# Patient Record
Sex: FEMALE | Race: AMERICAN INDIAN OR ALASKA NATIVE
[De-identification: names, ages, dates, MRNs, and addresses within clinical notes are randomized per-mention and may not be internally consistent; named-entity substitution may affect disease eponyms.]

---

## 2018-06-20 ENCOUNTER — HOSPITAL ENCOUNTER (EMERGENCY)
Dept: HOSPITAL 31 - C.ER | Age: 58
Discharge: HOME | End: 2018-06-20
Payer: COMMERCIAL

## 2018-06-20 VITALS — TEMPERATURE: 99 F

## 2018-06-20 VITALS — RESPIRATION RATE: 18 BRPM | HEART RATE: 78 BPM | SYSTOLIC BLOOD PRESSURE: 154 MMHG | DIASTOLIC BLOOD PRESSURE: 78 MMHG

## 2018-06-20 VITALS — OXYGEN SATURATION: 99 %

## 2018-06-20 DIAGNOSIS — S91.311A: Primary | ICD-10-CM
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
## 2018-06-20 NOTE — C.PDOC
History Of Present Illness





58 y/o female c/o injury to bottom of right foot that occureed last night. pt 

was wearing flip flop sandals that slid around on her foot and  and 

accidentally stepped onto the remnants of a metal sign post sticking out of 

ground  last tdap unk.  no hx dm. pt sts bare foot stepped on part. 


Time Seen by Provider: 06/20/18 16:22


Chief Complaint (Nursing): Abnormal Skin Integrity


History Per: Patient


History/Exam Limitations: no limitations


Onset/Duration Of Symptoms: Days (2)


Current Symptoms Are (Timing): Still Present


Location Of Injury: Right: Foot


Quality Of Symptoms: Painful


Severity: Moderate





Past Medical History


Reviewed: Historical Data, Nursing Documentation, Vital Signs


Vital Signs: 


 Last Vital Signs











Temp  99 F   06/20/18 16:11


 


Pulse  78   06/20/18 18:02


 


Resp  18   06/20/18 18:02


 


BP  154/78 H  06/20/18 18:02


 


Pulse Ox  99   06/20/18 22:42














- Medical History


PMH: No Chronic Diseases


Family History: States: Unknown Family Hx





- Social History


Hx Tobacco Use: Yes


Hx Alcohol Use: No


Hx Substance Use: No





- Immunization History


Hx Tetanus Toxoid Vaccination: No


Hx Influenza Vaccination: No


Hx Pneumococcal Vaccination: No





Review Of Systems


Constitutional: Negative for: Fever, Chills


Skin: Positive for: Other (flap laceration to sole of foot).  Negative for: Rash


Neurological: Negative for: Weakness, Numbness





Physical Exam





- Physical Exam


Appears: Non-toxic, No Acute Distress


Skin: Warm, Dry, Other (1 cm x 0.5 cm flap laceration to sole of right foot 

over heel. no drainage,no bleeding. )


Pulses: Right Dorsalis Pedis: Normal


Neurological/Psych: Oriented x3, Normal Speech, Normal Cognition, Normal Motor, 

Normal Sensation





ED Course And Treatment


O2 Sat by Pulse Oximetry: 99





Medical Decision Making


Medical Decision Making: 





cut foot on metal yesterday- small flap laceration. occurred more than 24 hrs 

ago, no suturing. will give tdap booster, irrigate, wound care.  soaked foot in 

betadine/saline solution. dressing applied with bacitracin and post op shoe.  





Disposition


Counseled Patient/Family Regarding: Diagnosis, Need For Followup, Rx Given





- Disposition


Referrals: 


Podiatry Clinic [Outside]


Disposition: HOME/ ROUTINE


Disposition Time: 17:51


Condition: GOOD


Additional Instructions: 


Keep wound clean and dry. Tylenol for pain.  Wash with soap and water daily. 

Return to ER for any sign of infection, such as redness, swelling, pus from 

foot or any other concerns.  


Prescriptions: 


Acetaminophen [Tylenol 325mg tab] 650 mg PO Q6 #30 tab


Forms:  General Discharge Instructions, [facility] (English), Work Excuse





- Clinical Impression


Clinical Impression: 


 Laceration of right heel

## 2021-12-09 ENCOUNTER — COMPLETE EYE EXAM (OUTPATIENT)
Dept: URBAN - METROPOLITAN AREA CLINIC 110 | Facility: CLINIC | Age: 61
End: 2021-12-09

## 2021-12-09 DIAGNOSIS — H52.4: ICD-10-CM

## 2021-12-09 DIAGNOSIS — H02.826: ICD-10-CM

## 2021-12-09 DIAGNOSIS — H52.03: ICD-10-CM

## 2021-12-09 DIAGNOSIS — H25.813: ICD-10-CM

## 2021-12-09 PROCEDURE — 92014 COMPRE OPH EXAM EST PT 1/>: CPT

## 2021-12-09 PROCEDURE — 92015 DETERMINE REFRACTIVE STATE: CPT

## 2021-12-09 ASSESSMENT — TONOMETRY
OS_IOP_MMHG: 20
OD_IOP_MMHG: 18

## 2021-12-09 ASSESSMENT — VISUAL ACUITY
OU_CC: J1+
OD_CC: 20/30-2
OS_CC: 20/30-3

## 2022-03-21 ENCOUNTER — COMPLETE EYE EXAM (OUTPATIENT)
Dept: URBAN - METROPOLITAN AREA CLINIC 110 | Facility: CLINIC | Age: 62
End: 2022-03-21

## 2022-03-21 DIAGNOSIS — H25.813: ICD-10-CM

## 2022-03-21 DIAGNOSIS — H52.4: ICD-10-CM

## 2022-03-21 DIAGNOSIS — H02.826: ICD-10-CM

## 2022-03-21 DIAGNOSIS — H52.03: ICD-10-CM

## 2022-03-21 PROCEDURE — 99213 OFFICE O/P EST LOW 20 MIN: CPT

## 2022-03-21 ASSESSMENT — TONOMETRY
OD_IOP_MMHG: 13
OS_IOP_MMHG: 19

## 2022-03-21 ASSESSMENT — VISUAL ACUITY
OD_CC: 20/25+3
OS_CC: 20/25

## (undated) RX ORDER — NEOMYCIN SULFATE, POLYMYXIN B SULFATE AND DEXAMETHASONE 3.5; 10000; 1 MG/G; [USP'U]/G; MG/G: 1/4 OINTMENT OPHTHALMIC TWICE A DAY